# Patient Record
(demographics unavailable — no encounter records)

---

## 2025-03-07 NOTE — ASSESSMENT
[FreeTextEntry1] : 1.  Acute kidney injury with nephrotic range proteinuria-from multiple myeloma.  Bone marrow biopsy showing multiple myeloma.  Creatinine was 3 a month ago.  Will recheck labs again today.  He would need treatment for multiple myeloma soon.  The patient was not aware of the diagnosis of myeloma.  I spent more than 40 minutes discussing the diagnosis and the need for treatment and the need to monitor his renal function closely.  Need to monitor his renal function closely has he started on treatment for multiple myeloma.  Discussed with the patient and his daughter using the . 2.  Multiple myeloma-new diagnosis.  He is going to see the oncologist on Monday. 3.  Hypertension blood pressure is controlled 4.  Anemia-will check a CBC.  Will defer management to hematology.  Will see him in the clinic again in a couple of weeks with repeat labs.

## 2025-03-07 NOTE — HISTORY OF PRESENT ILLNESS
DONALD Ortiz notified of CBC results. Provider will order a repeat CBC for morning/afternoon. [FreeTextEntry1] : HPI: 61-year-old gentleman with past medical history of GERD who was in the hospital recently for influenza.  He was hospitalized in February 2025 for influenza.  Lab work done at that time showed that creatinine was 3.2 he also had proteinuria.  Calcium was 11 on admission and his hemoglobin was 8.  He had serum protein electrophoresis which showed IgG kappa paraproteinemia along with Bence-Hauser proteins of approximately 3.7 g in the urine.  His creatinine was 3 at the time of discharge.  He also underwent a bone marrow biopsy the biopsy is revealing 70 to 80% plasma cells.  He has not seen the oncologist yet. The patient was seen by Dr. Puga in the hospital   Patient currently denies any symptoms no joint pain.  He feels tired.  But appetite is good.  No lower extremity edema.  No metallic taste.  He is currently not on any medications.  PMH: Nephrolithiasis- one episode in 2018- no intervention GERD  PSH: hernia surgery *3  SH: from [facility]. 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

## 2025-03-10 NOTE — HISTORY OF PRESENT ILLNESS
[de-identified] : This is a 61 year old male who is here for a hospital follow up for newly diagnosed multiple myeloma.  His daughter is here with him for the visit.   He was recently admitted to [facility] from 2/1-2/6/25.  He was admitted with pneumonia/influenza, weakness, poor appetite.  He was treated with tamiflu but was found to have ARF.  Renal US (01/31) demonstrated findings significant for parenchymal disease, UA with proteinuria, total protein/creatinine 4.0.   Labs 2/1/25- TODD- IgG kappa monoclonal gammopathy.  IgG 4802, kappa , dennis FLC 1.04- ratio 279.  Bone marrow biopsy completed on 2/5/25- Plasma cell myeloma, plasma cells comprise about 70% of marrow Bone marrow with increased cellularity of 80%. There are increased plasma cells interstitially and forming sheets, myelopoiesis is present with maturation, decreased erythroid precursors. Megakaryocytes are present in normal number and morphology. Iron stores are present. Abnormal karyotype- 57,XY,+3,+6,+7,+8,add(8)(q22),+9,+9,+11,+15,+17,+19,+mar {3  }/57,idem,+5,add(8)(q24) {2  }/46,XY {19  } ABNORMAL FISH MULTIPLE MYELOMA PANEL - FGFR3::IGH fusion detected (13.5%) Three copies of CCND1 detected (8.5%) Three copies of TP53 detected (9.5%)  CT of her c/t/l spine no contrast with multilevel nonspecific lytic lucencies may represent the presence of multiple myeloma or metastatic disease. Predominantly mild degenerative changes.

## 2025-03-26 NOTE — HISTORY OF PRESENT ILLNESS
[de-identified] : This is a 61 year old male who is here for a hospital follow up for newly diagnosed multiple myeloma.  His daughter is here with him for the visit.   He was recently admitted to [facility] from 2/1-2/6/25.  He was admitted with pneumonia/influenza, weakness, poor appetite.  He was treated with tamiflu but was found to have ARF.  Renal US (01/31) demonstrated findings significant for parenchymal disease, UA with proteinuria, total protein/creatinine 4.0.   Labs 2/1/25- TODD- IgG kappa monoclonal gammopathy.  IgG 4802, kappa , dennis FLC 1.04- ratio 279.  Bone marrow biopsy completed on 2/5/25- Plasma cell myeloma, plasma cells comprise about 70% of marrow Bone marrow with increased cellularity of 80%. There are increased plasma cells interstitially and forming sheets, myelopoiesis is present with maturation, decreased erythroid precursors. Megakaryocytes are present in normal number and morphology. Iron stores are present. Abnormal karyotype- 57,XY,+3,+6,+7,+8,add(8)(q22),+9,+9,+11,+15,+17,+19,+mar  {3    }/57,idem,+5,add(8)(q24)  {2    }/46,XY  {19    } ABNORMAL FISH MULTIPLE MYELOMA PANEL - FGFR3::IGH fusion detected (13.5%) Three copies of CCND1 detected (8.5%) Three copies of TP53 detected (9.5%)  CT of her c/t/l spine no contrast with multilevel nonspecific lytic lucencies may represent the presence of multiple myeloma or metastatic disease. Predominantly mild degenerative changes.    [de-identified] :  (Tamazight): Radha [id number] Today is Cycle 1 Day 8 of CyBor-D + Daratumumab. The patient reports he is doing well. ROS is negative.  Doing well with chemo. Denies nausea, vomiting, constipation, diarrhea, numbness, tingling, abdominal pain, chest pain, dyspnea, bone/MSK pain.  Denies fevers, chills, LAD, unintentional weight loss, night sweats.  Patient did not go to his PET scan appt, reports he already had a scan in the hospital.

## 2025-03-26 NOTE — ASSESSMENT
[FreeTextEntry1] : The patient is a 61-year-old male with no significant PMHx presenting for evaluation and management of plasma cell myeloma.  Discussed myeloma in detail with the patient. Explained that multiple myeloma (MM) is typically characterized by the neoplastic proliferation of plasma cells producing a monoclonal immunoglobulin. The plasma cells proliferate in the bone marrow and can result in extensive skeletal destruction with osteolytic lesions, osteopenia, and/or pathologic fractures. Most patients with MM present with signs or symptoms related to the infiltration of plasma cells into the bone or other organs or to kidney damage from immunoglobulin deposition. IgG myeloma is the most common subtype.   3/10/25 Immunoelectrophoresis: - IgG Kappa Band identified - k/l FLC ratio: 283.39, k F, M Eliu 4.6, IgG 6440 3/10/25 Labs: CBC - WBC 10.66, HGB 8.3, ; Cr 2.36   The patient will be managed with CyBor-d + Rebecca for his plasma cell myeloma. Opted for CyBor-d given kidney injury.  -Daratumumab faspro 1800mg weekly x 8, then every other week x 8 doses, then monthly. -Cyclophosphamide 300 mg/m2 IV once weekly on days 1, 8, and 15 -Bortezomib 1.3 mg/m2 subcutaneously on Days 1, 8, and 15 -Dexamethasone 40 mg PO on Day 1, Dexamethasone 20 mg PO on Day 8 and 15  Acyclovir 200mg twice a day (renally dosed) for antiviral ppx.  Aspirin 81mg daily for thrombosis ppx.   Treatments: Daratumumab - 3/19/25, 25 Velcade - Cycle 1: 3/19/25 Cytoxan - Cycle 1: 3/19/25  CBC, CMP, immunoelectrophoresis drawn today.  3/26/25 Labs: CBC - WBC 9.15, HGB 7.8,   Explained to the patient the importance of getting a PET scan and explained the difference between PET scans and CT scans. The patient is agreeable to getting a PET scan, will coordinate appointment.   The patient follows with nephrologist Dr. Courtney. The patient saw him on 3/7/2025 for initial consultation and has a follow up scheduled for 2025.  The patient will follow up in 1 month.

## 2025-03-26 NOTE — REASON FOR VISIT
[Follow-Up Visit] : a follow-up visit for [Family Member] : family member [FreeTextEntry2] : Plasma Cell Myeloma

## 2025-03-26 NOTE — HISTORY OF PRESENT ILLNESS
[de-identified] : This is a 61 year old male who is here for a hospital follow up for newly diagnosed multiple myeloma.  His daughter is here with him for the visit.   He was recently admitted to [facility] from 2/1-2/6/25.  He was admitted with pneumonia/influenza, weakness, poor appetite.  He was treated with tamiflu but was found to have ARF.  Renal US (01/31) demonstrated findings significant for parenchymal disease, UA with proteinuria, total protein/creatinine 4.0.   Labs 2/1/25- TODD- IgG kappa monoclonal gammopathy.  IgG 4802, kappa , dennis FLC 1.04- ratio 279.  Bone marrow biopsy completed on 2/5/25- Plasma cell myeloma, plasma cells comprise about 70% of marrow Bone marrow with increased cellularity of 80%. There are increased plasma cells interstitially and forming sheets, myelopoiesis is present with maturation, decreased erythroid precursors. Megakaryocytes are present in normal number and morphology. Iron stores are present. Abnormal karyotype- 57,XY,+3,+6,+7,+8,add(8)(q22),+9,+9,+11,+15,+17,+19,+mar  {3    }/57,idem,+5,add(8)(q24)  {2    }/46,XY  {19    } ABNORMAL FISH MULTIPLE MYELOMA PANEL - FGFR3::IGH fusion detected (13.5%) Three copies of CCND1 detected (8.5%) Three copies of TP53 detected (9.5%)  CT of her c/t/l spine no contrast with multilevel nonspecific lytic lucencies may represent the presence of multiple myeloma or metastatic disease. Predominantly mild degenerative changes.    [de-identified] :  (Belarusian): Radha [id number] Today is Cycle 1 Day 8 of CyBor-D + Daratumumab. The patient reports he is doing well. ROS is negative.  Doing well with chemo. Denies nausea, vomiting, constipation, diarrhea, numbness, tingling, abdominal pain, chest pain, dyspnea, bone/MSK pain.  Denies fevers, chills, LAD, unintentional weight loss, night sweats.  Patient did not go to his PET scan appt, reports he already had a scan in the hospital.

## 2025-04-18 NOTE — PLAN
[FreeTextEntry1] : #MM follow with Dr. Jansen On treatment with CyBor-D + Daratumumab on ppx acyclovir 200mg BID, per pt w/ plans to uptitrate as kidney function improves administered prevnar 20 vaccine today suggested RZV vaccine, do not have in office and will need to get at pharmacy   #HTN BP in office today 146/89, repeat at end of visit 140/80 per patient has had HTN since teenager Encouraged regular exercise, weight loss, adhering to a DASH diet, minimizing alcohol intake, getting adequate sleep, and monitoring BP at home regularly prescribed BP monitor, advised patient to take reading 2-3 per week after medications and to note results for next visit Advised to take blood pressure at home if patient experiences dizziness, lightheadedness, or get a sudden headache start amlodipine 2.5mg daily  #RAUDEL Cr downtrending per serial CMPs follow w/ Dr. Courtney Per last nephrology note, patient was to begin taking sodium bicarb 1300mg BID, however patient states he was unaware of this and did not  prescription resent sodium bicarb prescription as above  #throat irritation seasonal allergies vs URI sent flonase sent RVP  #Health Maintenance Routine EKG performed today, NSR has never had colonoscopy, spoke to patient about the necessity for it at some point but will put on hold for now Routine labs sent including CBC, CMP, A1C, Lipid Profile, TSH w/ reflex, HIV, Hep C, UA administered prevnar 20 today suggested pt to receive RZV vaccine at pharmacy  follow up in 5 weeks

## 2025-04-18 NOTE — HISTORY OF PRESENT ILLNESS
[Family Member] : family member [Other: _____] : [unfilled] [FreeTextEntry1] : cpe [de-identified] : 62 y/o Khmer speaking M w/ PMH HTN, anemia, recent diagnosis MM, RAUDEL presents to establish care. Recent admission at [facility] 2/1-2/6/25 where he presented for malaise and decreased appetite unable to tolerate PO intake. Was found w/ Cr 3.72 and admitted for RAUDEL. Renal US (01/31) +parancymal disease. UA +protein. +beta-2 microglobulin & M-Eliu. Bone marrow biopsy completed on 2/5 and confirmed MM. CT of her c/t/l spine no contrast with multilevel nonspecific lytic lucencies. Following with heme/onc. On treatment with CyBor-D + Daratumumab. Recent PET on 04/03/2025 generally unremarkable. Following with Nephrology for RAUDEL (improving per last labs). Per last nephrology note, patient was to begin taking sodium bicard 1300mg BID, however patient states he was unaware of this and did not  prescription. Patient's only complaint is mild throat irritation that began 2 days ago. No sick contacts. He notes he experiences yearly seasonal allergies. States he is tolerating chemotherapy well and feels well. Still grieving loss of his wife in February.

## 2025-04-18 NOTE — HEALTH RISK ASSESSMENT
[No] : No [0] : 1) Little interest or pleasure doing things: Not at all (0) [Former] : Former [> 15 Years] : > 15 Years [HIV Test offered] : HIV Test offered [Hepatitis C test offered] : Hepatitis C test offered [Good] : ~his/her~ current health as good [Very Good] : ~his/her~  mood as very good [Little interest or pleasure doing things] : 1) Little interest or pleasure doing things [Feeling down, depressed, or hopeless] : 2) Feeling down, depressed, or hopeless [1] : 2) Feeling down, depressed, or hopeless for several days (1) [Cost] : cost [Lack of understanding] : lack of understanding [With Family] : lives with family [# of Members in Household ___] :  household currently consist of [unfilled] member(s) [Retired] : retired [] :  [Feels Safe at Home] : Feels safe at home [Fully functional (bathing, dressing, toileting, transferring, walking, feeding)] : Fully functional (bathing, dressing, toileting, transferring, walking, feeding) [Fully functional (using the telephone, shopping, preparing meals, housekeeping, doing laundry, using] : Fully functional and needs no help or supervision to perform IADLs (using the telephone, shopping, preparing meals, housekeeping, doing laundry, using transportation, managing medications and managing finances) [Smoke Detector] : smoke detector [Carbon Monoxide Detector] : carbon monoxide detector [Safety elements used in home] : safety elements used in home [Seat Belt] :  uses seat belt [[id number]] : 0 [Change in mental status noted] : No change in mental status noted [Language] : denies difficulty with language [Behavior] : denies difficulty with behavior [Learning/Retaining New Information] : denies difficulty learning/retaining new information [Handling Complex Tasks] : denies difficulty handling complex tasks [Reasoning] : denies difficulty with reasoning [Spatial Ability and Orientation] : denies difficulty with spatial ability and orientation [Sexually Active] : not sexually active [High Risk Behavior] : no high risk behavior [Reports changes in hearing] : Reports no changes in hearing [Reports changes in vision] : Reports no changes in vision [Travel to Developing Areas] : does not  travel to developing areas [Caregiver Concerns] : does not have caregiver concerns [ColonoscopyDate] : never [de-identified] : lives with son [FreeTextEntry2] : previous  [FreeTextEntry3] : Lost wife February 2025

## 2025-04-18 NOTE — COUNSELING
[Behavioral health counseling provided] : Behavioral health counseling provided [Sleep ___ hours/day] : Sleep [unfilled] hours/day [Engage in a relaxing activity] : Engage in a relaxing activity [Plan in advance] : Plan in advance [Good understanding] : Patient has a good understanding of lifestyle changes and steps needed to achieve self management goal

## 2025-04-18 NOTE — DATA REVIEWED
[FreeTextEntry1] : Reviewed documents from recent hospitalization in February, notes from heme/onc and nephrology. Reviewed all imaging and recent lab work

## 2025-04-27 NOTE — HISTORY OF PRESENT ILLNESS
[de-identified] : This is a 61 year old male who is here for a hospital follow up for newly diagnosed multiple myeloma.  His daughter is here with him for the visit.   He was recently admitted to [facility] from 2/1-2/6/25.  He was admitted with pneumonia/influenza, weakness, poor appetite.  He was treated with tamiflu but was found to have ARF.  Renal US (01/31) demonstrated findings significant for parenchymal disease, UA with proteinuria, total protein/creatinine 4.0.   Labs 2/1/25- TODD- IgG kappa monoclonal gammopathy.  IgG 4802, kappa , dennis FLC 1.04- ratio 279.  Bone marrow biopsy completed on 2/5/25- Plasma cell myeloma, plasma cells comprise about 70% of marrow Bone marrow with increased cellularity of 80%. There are increased plasma cells interstitially and forming sheets, myelopoiesis is present with maturation, decreased erythroid precursors. Megakaryocytes are present in normal number and morphology. Iron stores are present. Abnormal karyotype- 57,XY,+3,+6,+7,+8,add(8)(q22),+9,+9,+11,+15,+17,+19,+mar   {3      }/57,idem,+5,add(8)(q24)   {2      }/46,XY   {19      } ABNORMAL FISH MULTIPLE MYELOMA PANEL - FGFR3::IGH fusion detected (13.5%) Three copies of CCND1 detected (8.5%) Three copies of TP53 detected (9.5%)  CT of her c/t/l spine no contrast with multilevel nonspecific lytic lucencies may represent the presence of multiple myeloma or metastatic disease. Predominantly mild degenerative changes.    [de-identified] : History taken via .   He is receiving C2D8 of cytoxan/velcade, week 6 of shahzad faspro.  He is tearful, missing his wife.  No bone pain.  Appetite/weight are fair.  No neuropathy.  Urinating well.

## 2025-04-27 NOTE — HISTORY OF PRESENT ILLNESS
[de-identified] : This is a 61 year old male who is here for a hospital follow up for newly diagnosed multiple myeloma.  His daughter is here with him for the visit.   He was recently admitted to [facility] from 2/1-2/6/25.  He was admitted with pneumonia/influenza, weakness, poor appetite.  He was treated with tamiflu but was found to have ARF.  Renal US (01/31) demonstrated findings significant for parenchymal disease, UA with proteinuria, total protein/creatinine 4.0.   Labs 2/1/25- TODD- IgG kappa monoclonal gammopathy.  IgG 4802, kappa , dennis FLC 1.04- ratio 279.  Bone marrow biopsy completed on 2/5/25- Plasma cell myeloma, plasma cells comprise about 70% of marrow Bone marrow with increased cellularity of 80%. There are increased plasma cells interstitially and forming sheets, myelopoiesis is present with maturation, decreased erythroid precursors. Megakaryocytes are present in normal number and morphology. Iron stores are present. Abnormal karyotype- 57,XY,+3,+6,+7,+8,add(8)(q22),+9,+9,+11,+15,+17,+19,+mar   {3      }/57,idem,+5,add(8)(q24)   {2      }/46,XY   {19      } ABNORMAL FISH MULTIPLE MYELOMA PANEL - FGFR3::IGH fusion detected (13.5%) Three copies of CCND1 detected (8.5%) Three copies of TP53 detected (9.5%)  CT of her c/t/l spine no contrast with multilevel nonspecific lytic lucencies may represent the presence of multiple myeloma or metastatic disease. Predominantly mild degenerative changes.    [de-identified] : History taken via .   He is receiving C2D8 of cytoxan/velcade, week 6 of shahzad faspro.  He is tearful, missing his wife.  No bone pain.  Appetite/weight are fair.  No neuropathy.  Urinating well.

## 2025-04-27 NOTE — ASSESSMENT
[FreeTextEntry1] : The patient is a 61-year-old male with no significant PMHx presenting for evaluation and management of plasma cell myeloma.  IgG kappa myeloma, ARF, anemia.   3/10/25 Immunoelectrophoresis: - IgG Kappa Band identified - k/l FLC ratio: 283.39, k F, M Eliu 4.6, IgG 6440 3/10/25 Labs: CBC - WBC 10.66, HGB 8.3, ; Cr 2.36   The patient will be managed with CyBor-d + Rebecca for his plasma cell myeloma. Opted for CyBor-d given kidney injury.  -Daratumumab faspro 1800mg weekly x 8, then every other week x 8 doses, then monthly. -Cyclophosphamide 300 mg/m2 IV once weekly on days 1, 8, and 15 -Bortezomib 1.3 mg/m2 subcutaneously on Days 1, 8, and 15 -Dexamethasone 40 mg PO on Day 1, Dexamethasone 20 mg PO on Day 8 and 15  Acyclovir 200mg twice a day (renally dosed) for antiviral ppx.  Aspirin 81mg daily for thrombosis ppx.   Treatments: Daratumumab - 3/19/25, 25 Velcade - Cycle 1: 3/19/25 Cytoxan - Cycle 1: 3/19/25  PET/CT 4/3/25-  No focal FDG avid lytic lesions. Diffuse background of FDG uptake in the skeleton likely due to recent chemotherapy can obscure lesions. No plasmacytomas.  25- He is currently cycle 2/day 8 cyborD, cycle 1, week 6 of faspro.  CBC, CMP, immunoelectrophoresis drawn today.  25- WBC 6.71 Hg 8.2 Plt 221  Continue treatment as scheduled.  Plan for auto PSCT consultation.   The patient follows with nephrologist Dr. Courtney.   Social work to follow up with patient's bereavement.   The patient will follow up in 1 month.

## 2025-06-02 NOTE — PLAN
[FreeTextEntry1] : 60 y/o Iraqi speaking M w/ PMH HTN, anemia, recent diagnosis MM (on chemo), CKD3a #MM #Anemia follows with Dr. Jansen Hb 9.9 On treatment with CyBor-D + Daratumumab (last session 5/28/25) on ppx acyclovir 200mg BID, per pt w/ plans to uptitrate as kidney function improves    #HTN - Stable - C/w amlodipine 2.5 mg QD - Low-sodium diet.  #CKD3a - Likely in the setting of recent diagnosis of MM - Follow w/ Dr. Courtney (nephrology) - Continue with sodium bicarb 650 mg 2 tabs BID - Check CMP today  Follow-up in 3 months.

## 2025-06-02 NOTE — HEALTH RISK ASSESSMENT
[Good] : ~his/her~ current health as good [Very Good] : ~his/her~  mood as very good [No] : No [Little interest or pleasure doing things] : 1) Little interest or pleasure doing things [0] : 1) Little interest or pleasure doing things: Not at all (0) [Feeling down, depressed, or hopeless] : 2) Feeling down, depressed, or hopeless [1] : 2) Feeling down, depressed, or hopeless for several days (1) [Former] : Former [> 15 Years] : > 15 Years [Cost] : cost [Lack of understanding] : lack of understanding [HIV Test offered] : HIV Test offered [Hepatitis C test offered] : Hepatitis C test offered [With Family] : lives with family [# of Members in Household ___] :  household currently consist of [unfilled] member(s) [Retired] : retired [] :  [Feels Safe at Home] : Feels safe at home [Fully functional (bathing, dressing, toileting, transferring, walking, feeding)] : Fully functional (bathing, dressing, toileting, transferring, walking, feeding) [Fully functional (using the telephone, shopping, preparing meals, housekeeping, doing laundry, using] : Fully functional and needs no help or supervision to perform IADLs (using the telephone, shopping, preparing meals, housekeeping, doing laundry, using transportation, managing medications and managing finances) [Smoke Detector] : smoke detector [Carbon Monoxide Detector] : carbon monoxide detector [Safety elements used in home] : safety elements used in home [Seat Belt] :  uses seat belt [[id number]] : 0 [Change in mental status noted] : No change in mental status noted [Language] : denies difficulty with language [Behavior] : denies difficulty with behavior [Learning/Retaining New Information] : denies difficulty learning/retaining new information [Handling Complex Tasks] : denies difficulty handling complex tasks [Reasoning] : denies difficulty with reasoning [Spatial Ability and Orientation] : denies difficulty with spatial ability and orientation [Sexually Active] : not sexually active [High Risk Behavior] : no high risk behavior [Reports changes in hearing] : Reports no changes in hearing [Reports changes in vision] : Reports no changes in vision [Travel to Developing Areas] : does not  travel to developing areas [Caregiver Concerns] : does not have caregiver concerns [ColonoscopyDate] : never [de-identified] : lives with son [FreeTextEntry2] : previous  [FreeTextEntry3] : Lost wife February 2025

## 2025-06-02 NOTE — HISTORY OF PRESENT ILLNESS
[Family Member] : family member [Other: _____] : [unfilled] [de-identified] : 60 y/o Tuvaluan speaking M w/ PMH HTN, anemia, recent diagnosis MM (on chemo), CKD3a Here for follow-up. Offers no acute complaints. Last session of chemo with Dr. Jansen on 5/28/25. Compliant with all medications. Follows with nephrology also. [FreeTextEntry1] : cpe

## 2025-06-03 NOTE — HISTORY OF PRESENT ILLNESS
[FreeTextEntry1] : 61-year-old man with Multiple myeloma, Nephrolithiasis- one episode in 2018- no intervention, GERD, hernia surgery presents for CKD follow up. 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 - last session on 05/28. Tolerating Chemotherapy well- denies any acute complaint.    SH: from [facility]; lives with daughter non smoker., no illicit drug/ etoh abuse

## 2025-06-03 NOTE — ASSESSMENT
[FreeTextEntry1] : 1.  Acute kidney injury with nephrotic range proteinuria-from multiple myeloma.  Bone marrow biopsy showing multiple myeloma.  Creatinine peaked at mid 3 and now improving to 1.6.  2.  Multiple myeloma-new diagnosis.  On CyBorD chemotherapy per Dr. Jansen 3.  Hypertension blood pressure is controlled. Continue amlodipine 2.5 mg daily 4.  Anemia- management as per Oncology 5.  Metabolic acidosis; serum Co2 stable- Continue sodium bicarbonate 1300 mg twice a day 5.  Hyperkalemia ; resolved  RtC in 4-6 months with Dr Courtney

## 2025-06-03 NOTE — PHYSICAL EXAM
[General Appearance - Alert] : alert [General Appearance - In No Acute Distress] : in no acute distress [Sclera] : the sclera and conjunctiva were normal [PERRL With Normal Accommodation] : pupils were equal in size, round, and reactive to light [Extraocular Movements] : extraocular movements were intact [Outer Ear] : the ears and nose were normal in appearance [Oropharynx] : the oropharynx was normal [Neck Appearance] : the appearance of the neck was normal [Neck Cervical Mass (___cm)] : no neck mass was observed [Jugular Venous Distention Increased] : there was no jugular-venous distention [Thyroid Diffuse Enlargement] : the thyroid was not enlarged [Thyroid Nodule] : there were no palpable thyroid nodules [Auscultation Breath Sounds / Voice Sounds] : lungs were clear to auscultation bilaterally [Heart Rate And Rhythm] : heart rate was normal and rhythm regular [Heart Sounds] : normal S1 and S2 [Heart Sounds Gallop] : no gallops [Murmurs] : no murmurs [Heart Sounds Pericardial Friction Rub] : no pericardial rub [Bowel Sounds] : normal bowel sounds [Abdomen Soft] : soft [Abdomen Tenderness] : non-tender [Abdomen Mass (___ Cm)] : no abdominal mass palpated [Edema] : there was no peripheral edema [] : no rash [Affect] : the affect was normal [Mood] : the mood was normal

## 2025-06-04 NOTE — ASSESSMENT
[FreeTextEntry1] : The patient is a 61-year-old male with no significant PMHx presenting for evaluation and management of plasma cell myeloma.  IgG kappa myeloma, ARF, anemia.   3/10/25 Immunoelectrophoresis: - IgG Kappa Band identified - k/l FLC ratio: 283.39, k F, M Eliu 4.6, IgG 6440 3/10/25 Labs: CBC - WBC 10.66, HGB 8.3, ; Cr 2.36 25- IgG 3656, kappa /ratio 135   The patient will be managed with CyBor-d + Rebecca for his plasma cell myeloma (due to renal failure).  -Daratumumab faspro 1800mg weekly x 8, then every other week x 8 doses, then monthly. -Cyclophosphamide 300 mg/m2 IV once weekly on days 1, 8, and 15 -Bortezomib 1.3 mg/m2 subcutaneously on Days 1, 8, and 15 -Dexamethasone 40 mg PO on Day 1, Dexamethasone 20 mg PO on Day 8 and 15  Acyclovir 200mg twice a day (renally dosed) for antiviral ppx.  Aspirin 81mg daily for thrombosis ppx.   PET/CT 4/3/25-  No focal FDG avid lytic lesions. Diffuse background of FDG uptake in the skeleton likely due to recent chemotherapy can obscure lesions. No plasmacytomas.  25- He is currently cycle 2/week 2 of rebecca faspro, completed 3 cycles of CyborD last week.  Clinically responding.  Improved immunoglobulins, creatinine down to 1.6 from 3.9.  Plan for auto PSCT consultation- discussed with patient today.  Continue acyclovir prophylaxis.   The patient follows with nephrologist Dr. Courtney/Dr. Puga.    Social work following due to his sadness, grieving.   The patient will follow up in 1 month.

## 2025-06-04 NOTE — HISTORY OF PRESENT ILLNESS
[de-identified] : This is a 61 year old male who is here for a hospital follow up for newly diagnosed multiple myeloma.  His daughter is here with him for the visit.   He was recently admitted to [facility] from 2/1-2/6/25.  He was admitted with pneumonia/influenza, weakness, poor appetite.  He was treated with tamiflu but was found to have ARF.  Renal US (01/31) demonstrated findings significant for parenchymal disease, UA with proteinuria, total protein/creatinine 4.0.   Labs 2/1/25- TODD- IgG kappa monoclonal gammopathy.  IgG 4802, kappa , dennis FLC 1.04- ratio 279.  Bone marrow biopsy completed on 2/5/25- Plasma cell myeloma, plasma cells comprise about 70% of marrow Bone marrow with increased cellularity of 80%. There are increased plasma cells interstitially and forming sheets, myelopoiesis is present with maturation, decreased erythroid precursors. Megakaryocytes are present in normal number and morphology. Iron stores are present. Abnormal karyotype- 57,XY,+3,+6,+7,+8,add(8)(q22),+9,+9,+11,+15,+17,+19,+mar    {3        }/57,idem,+5,add(8)(q24)    {2        }/46,XY    {19        } ABNORMAL FISH MULTIPLE MYELOMA PANEL - FGFR3::IGH fusion detected (13.5%) Three copies of CCND1 detected (8.5%) Three copies of TP53 detected (9.5%)  CT of her c/t/l spine no contrast with multilevel nonspecific lytic lucencies may represent the presence of multiple myeloma or metastatic disease. Predominantly mild degenerative changes.    [de-identified] : History taken via .   He is currently shahzad faspro cycle 2, week 2, cyborD completed 3 cycles.  Overall he is feeling much better.  Still sad and tearful, grieving for his wife.  Urinating well, no fever/chills, no bone pains.  No peripheral neuropathy.

## 2025-06-18 NOTE — HISTORY OF PRESENT ILLNESS
[de-identified] : This is a 61 year old male who is here for a hospital follow up for newly diagnosed multiple myeloma.  His daughter is here with him for the visit.   He was recently admitted to [facility] from 2/1-2/6/25.  He was admitted with pneumonia/influenza, weakness, poor appetite.  He was treated with tamiflu but was found to have ARF.  Renal US (01/31) demonstrated findings significant for parenchymal disease, UA with proteinuria, total protein/creatinine 4.0.   Labs 2/1/25- TODD- IgG kappa monoclonal gammopathy.  IgG 4802, kappa , dennis FLC 1.04- ratio 279.  Bone marrow biopsy completed on 2/5/25- Plasma cell myeloma, plasma cells comprise about 70% of marrow Bone marrow with increased cellularity of 80%. There are increased plasma cells interstitially and forming sheets, myelopoiesis is present with maturation, decreased erythroid precursors. Megakaryocytes are present in normal number and morphology. Iron stores are present. Abnormal karyotype- 57,XY,+3,+6,+7,+8,add(8)(q22),+9,+9,+11,+15,+17,+19,+mar    {3        \}/57,idem,+5,add(8)(q24)    {2        \}/46,XY    {19        \} ABNORMAL FISH MULTIPLE MYELOMA PANEL - FGFR3::IGH fusion detected (13.5%) Three copies of CCND1 detected (8.5%) Three copies of TP53 detected (9.5%)  CT of her c/t/l spine no contrast with multilevel nonspecific lytic lucencies may represent the presence of multiple myeloma or metastatic disease. Predominantly mild degenerative changes.    [de-identified] : History taken via : Jeromy #[id number] The patient reports becoming ill over the weekend. He has subjective fevers and chills at home (not measured), dry/nonproductive cough, and sore throat. Chills have now resolved.  Denies SOB, dyspnea on exertion, recent sick contacts.

## 2025-06-18 NOTE — HISTORY OF PRESENT ILLNESS
[FreeTextEntry8] : uri  immunocompromised due to PLasma Cell treatments has been coughing with congestion  no fever no sob nvd or palpitations

## 2025-06-18 NOTE — PHYSICAL EXAM
[Normal] : affect appropriate [Ulcers] : no ulcers [Mucositis] : no mucositis [de-identified] : mild erythema of the pharynx [de-identified] : diminished bilaterally but clear

## 2025-06-18 NOTE — ASSESSMENT
[FreeTextEntry1] : uri but immunocompromised  check viral panel abx given bp stable  getting chemo next week for plasma cell

## 2025-06-18 NOTE — ASSESSMENT
[FreeTextEntry1] : The patient is a 61-year-old male with no significant PMHx presenting for evaluation and management of plasma cell myeloma.  IgG kappa myeloma, ARF, anemia.   3/10/25 Immunoelectrophoresis: - IgG Kappa Band identified - k/l FLC ratio: 283.39, k F, M Eliu 4.6, IgG 6440 3/10/25 Labs: CBC - WBC 10.66, HGB 8.3, ; Cr 2.36 25- IgG 3656, kappa /ratio 135  The patient will be managed with CyBor-d + Rebecca for his plasma cell myeloma (due to renal failure).  -Daratumumab faspro 1800mg weekly x 8, then every other week x 8 doses, then monthly. -Cyclophosphamide 300 mg/m2 IV once weekly on days 1, 8, and 15 -Bortezomib 1.3 mg/m2 subcutaneously on Days 1, 8, and 15 -Dexamethasone 40 mg PO on Day 1, Dexamethasone 20 mg PO on Day 8 and 15  Acyclovir 200mg twice a day (renally dosed) for antiviral ppx.  Aspirin 81mg daily for thrombosis ppx.   PET/CT 4/3/25-  No focal FDG avid lytic lesions. Diffuse background of FDG uptake in the skeleton likely due to recent chemotherapy can obscure lesions. No plasmacytomas.  He has been clinically responding. Improved immunoglobulins, creatinine improving.  Plan for auto PSCT consultation - previously discussed with the patient.   The patient follows with nephrologist Dr. Courtney/Dr. Puga.    Social work following due to his sadness, grieving.   The patient presented to the treatment room today for treatment. Provider was notified by nurse Denny that the patient was complaining of fevers and chills over the weekend and overall not feeling well. Provider assessed patient in the treatment room. Mild erythema of the pharynx on physical exam, lungs were clear but diminished equally bilaterally. Patient with a temp of 99.4F, /88, HR 91, RR 16, SpO2 98%. Advised the patient that we do not want to treat him when he is feeling ill. Delay treatment x 1 week to allow the patient time to recover. Advised the patient that he should be tested for covid/flu/rsv. The patient will follow up with his PCP, or if no urgent appt available with his PCP, he will follow up at urgent care. Advised on supportive measures such as tylenol, lozenges, rest, etc.    The patient will follow up as indicated by his treatment schedule.

## 2025-06-18 NOTE — REVIEW OF SYSTEMS
[Negative] : Allergic/Immunologic [Fever] : fever [Chills] : chills [Fatigue] : fatigue [Cough] : cough [Night Sweats] : no night sweats [Recent Change In Weight] : ~T no recent weight change [Shortness Of Breath] : no shortness of breath [Wheezing] : no wheezing [SOB on Exertion] : no shortness of breath during exertion [FreeTextEntry4] : +sore throat

## 2025-06-18 NOTE — REASON FOR VISIT
[Urgent Visit] : a urgent visit for [FreeTextEntry2] : Plasma Cell Myeloma patient being seen in the treatment room for URI symptoms